# Patient Record
Sex: MALE | Race: OTHER | HISPANIC OR LATINO | URBAN - METROPOLITAN AREA
[De-identification: names, ages, dates, MRNs, and addresses within clinical notes are randomized per-mention and may not be internally consistent; named-entity substitution may affect disease eponyms.]

---

## 2017-01-25 ENCOUNTER — OUTPATIENT (OUTPATIENT)
Dept: OUTPATIENT SERVICES | Facility: HOSPITAL | Age: 22
LOS: 1 days | End: 2017-01-25
Payer: COMMERCIAL

## 2017-01-25 DIAGNOSIS — S43.015D ANTERIOR DISLOCATION OF LEFT HUMERUS, SUBSEQUENT ENCOUNTER: ICD-10-CM

## 2017-01-25 DIAGNOSIS — Z51.89 ENCOUNTER FOR OTHER SPECIFIED AFTERCARE: ICD-10-CM

## 2017-02-17 PROCEDURE — 97110 THERAPEUTIC EXERCISES: CPT

## 2017-02-17 PROCEDURE — 97140 MANUAL THERAPY 1/> REGIONS: CPT

## 2017-02-17 PROCEDURE — 97010 HOT OR COLD PACKS THERAPY: CPT

## 2017-02-17 PROCEDURE — 97162 PT EVAL MOD COMPLEX 30 MIN: CPT

## 2017-11-01 ENCOUNTER — EMERGENCY (EMERGENCY)
Facility: HOSPITAL | Age: 22
LOS: 1 days | Discharge: HOME | End: 2017-11-01
Attending: EMERGENCY MEDICINE
Payer: COMMERCIAL

## 2017-11-01 VITALS
WEIGHT: 149.91 LBS | RESPIRATION RATE: 18 BRPM | SYSTOLIC BLOOD PRESSURE: 133 MMHG | DIASTOLIC BLOOD PRESSURE: 87 MMHG | OXYGEN SATURATION: 99 % | HEART RATE: 86 BPM | HEIGHT: 68 IN | TEMPERATURE: 98 F

## 2017-11-01 VITALS — DIASTOLIC BLOOD PRESSURE: 66 MMHG | SYSTOLIC BLOOD PRESSURE: 126 MMHG | HEART RATE: 76 BPM | OXYGEN SATURATION: 99 %

## 2017-11-01 PROCEDURE — 96372 THER/PROPH/DIAG INJ SC/IM: CPT

## 2017-11-01 PROCEDURE — 99284 EMERGENCY DEPT VISIT MOD MDM: CPT | Mod: 25

## 2017-11-01 PROCEDURE — 99283 EMERGENCY DEPT VISIT LOW MDM: CPT | Mod: 25

## 2017-11-01 PROCEDURE — 73030 X-RAY EXAM OF SHOULDER: CPT | Mod: 26,LT

## 2017-11-01 PROCEDURE — 73030 X-RAY EXAM OF SHOULDER: CPT

## 2017-11-01 PROCEDURE — 99053 MED SERV 10PM-8AM 24 HR FAC: CPT

## 2017-11-01 RX ORDER — MORPHINE SULFATE 50 MG/1
8 CAPSULE, EXTENDED RELEASE ORAL ONCE
Qty: 0 | Refills: 0 | Status: DISCONTINUED | OUTPATIENT
Start: 2017-11-01 | End: 2017-11-01

## 2017-11-01 RX ADMIN — MORPHINE SULFATE 8 MILLIGRAM(S): 50 CAPSULE, EXTENDED RELEASE ORAL at 03:41

## 2017-11-01 NOTE — ED PROVIDER NOTE - SKIN, MLM
Abrasion to chin, nose, right arm, skin otherwise normal color for race, warm, dry and intact. No evidence of rash. Abrasion to chin, nose, right arm, Lt shoulder,  skin otherwise normal color for race, warm, dry and intact. No evidence of rash.

## 2017-11-01 NOTE — ED PROVIDER NOTE - PROGRESS NOTE DETAILS
Attempted reduction, reduction unsuccessful, pt will need procedural sedation. PA Note: Attempted reduction, reduction unsuccessful, pt will need procedural sedation. PA NOTE: PT seen resting comfortably in no acute distress, no acute complaints at this time, PT tolerating PO intake,  PT informed of all results, pt informed of possibility of change in radiology read after official read, PT will be DC home with OTC pain medication, follow up to ortho, sling, educated about when to return to the ED if needed. PT verbalizes that he understands all instructions and results.

## 2017-11-01 NOTE — ED PROVIDER NOTE - MUSCULOSKELETAL, MLM
Visible abnormality in left shoulder, unable to move. Spine appears normal, range of motion is not limited, no muscle or joint tenderness Visible abnormality in left shoulder, unable to move. Spine appears normal, range of motion is not limited at joints other than LT shoulder, no muscle tenderness, strength intact at hand and elbow.

## 2017-11-01 NOTE — ED ADULT NURSE REASSESSMENT NOTE - NS ED NURSE REASSESS COMMENT FT1
Ellie Henry at bedside @0501 gace 40mg propofol iv, @ 0504 10mg propofol, @ 0508 20mg propofol. procedure ended at 0513.  RN remains at bedside, pt sedated at this time, VSS will continue to monitor.

## 2017-11-01 NOTE — ED PROCEDURE NOTE - CPROC ED POST PROC CARE GUIDE1
Instructed patient/caregiver to follow-up with primary care physician./Verbal/written post procedure instructions were given to patient/caregiver./Elevate the injured extremity as instructed.

## 2017-11-01 NOTE — ED PROCEDURE NOTE - NS_ACPWITHSCRIBE_ED_ALL_ED
"I personally performed the services described in the documentation  recorded by the scribe in my presence, and it accurately and completely records my words and action.”
"I personally performed the services described in the documentation  recorded by the scribe in my presence, and it accurately and completely records my words and action.”

## 2017-11-01 NOTE — ED PROVIDER NOTE - OBJECTIVE STATEMENT
23 y/o male with PMHx left shoulder dislocation presents to the ED with c/o left shoulder pain, onset today. Pt states symptoms are similar to prior dislocation. Pt states he sustained injury after a trip and fall while running. Denies numbness, tingling, and any other acute symptoms and complaints at this time. 21 y/o male Rt hand dominate, with PMHx left shoulder dislocation UTD on vaccinations, presents to the ED with c/o left shoulder pain, onset today. Pt states symptoms are similar to prior dislocation. Pt states he sustained injury after a trip and fall while running. Denies numbness, tingling, and any other acute symptoms and complaints at this time denies LOC, head trama, N/V, dizziness.

## 2018-03-17 ENCOUNTER — EMERGENCY (EMERGENCY)
Facility: HOSPITAL | Age: 23
LOS: 1 days | Discharge: HOME | End: 2018-03-17
Attending: EMERGENCY MEDICINE | Admitting: EMERGENCY MEDICINE
Payer: COMMERCIAL

## 2018-03-17 VITALS
DIASTOLIC BLOOD PRESSURE: 80 MMHG | SYSTOLIC BLOOD PRESSURE: 146 MMHG | HEART RATE: 86 BPM | OXYGEN SATURATION: 98 % | RESPIRATION RATE: 18 BRPM | TEMPERATURE: 98 F | WEIGHT: 139.99 LBS | HEIGHT: 68 IN

## 2018-03-17 PROCEDURE — 99284 EMERGENCY DEPT VISIT MOD MDM: CPT | Mod: 25

## 2018-03-17 PROCEDURE — 99285 EMERGENCY DEPT VISIT HI MDM: CPT | Mod: 25

## 2018-03-17 PROCEDURE — 23650 CLTX SHO DSLC W/MNPJ WO ANES: CPT | Mod: LT

## 2018-03-17 PROCEDURE — 73030 X-RAY EXAM OF SHOULDER: CPT

## 2018-03-17 PROCEDURE — 73030 X-RAY EXAM OF SHOULDER: CPT | Mod: 26,LT

## 2018-03-17 PROCEDURE — 23650 CLTX SHO DSLC W/MNPJ WO ANES: CPT | Mod: 54

## 2018-03-17 NOTE — ED ADULT NURSE NOTE - CHPI ED SYMPTOMS NEG
no stiffness/no difficulty bearing weight/no abrasion/no numbness/no bruising/no fever/no back pain/no tingling/no weakness

## 2018-03-17 NOTE — ED ADULT NURSE NOTE - OBJECTIVE STATEMENT
Patient states that he was playing basketball and was pushed from behind. Patient states that he felt his left shoulder come out and tried to pop it back into place. Patient states that he is unable to get his shoulder back into place. Patient states that this has happened in the past. NAD noted.

## 2018-03-17 NOTE — ED PROVIDER NOTE - ATTENDING CONTRIBUTION TO CARE
seen with acp  here for dislocated shoulder hx of recurrent dislocations  pe deformity noted   scapular meneuver performed  dislocation reduced   agree with acps assessment hx and physical

## 2018-03-17 NOTE — ED PROVIDER NOTE - OBJECTIVE STATEMENT
23 yo M presented to ED with c/o shoulder dislocation. Pt states that he was playing basketball and reached out for ball and dislocated shoulder. Pt reports h/o dislocations in the past- 4x in 2 years. Pt did follow with Ortho and finished PT. Pt denies any paresthesias. Denies direct trauma.

## 2018-03-17 NOTE — ED ADULT TRIAGE NOTE - CHIEF COMPLAINT QUOTE
pt presents to ED with left shoulder pain. pt states "my shoulder is dislocated" s/p playing basketball. pt states his shoulder has been dislocated once before.

## 2018-11-30 ENCOUNTER — EMERGENCY (EMERGENCY)
Facility: HOSPITAL | Age: 23
LOS: 1 days | Discharge: HOME | End: 2018-11-30
Attending: STUDENT IN AN ORGANIZED HEALTH CARE EDUCATION/TRAINING PROGRAM
Payer: COMMERCIAL

## 2018-11-30 VITALS
HEART RATE: 83 BPM | RESPIRATION RATE: 18 BRPM | DIASTOLIC BLOOD PRESSURE: 96 MMHG | SYSTOLIC BLOOD PRESSURE: 141 MMHG | TEMPERATURE: 97 F | HEIGHT: 66 IN | WEIGHT: 145.06 LBS

## 2018-11-30 PROBLEM — S43.006A UNSPECIFIED DISLOCATION OF UNSPECIFIED SHOULDER JOINT, INITIAL ENCOUNTER: Chronic | Status: ACTIVE | Noted: 2018-03-17

## 2018-11-30 PROCEDURE — 73030 X-RAY EXAM OF SHOULDER: CPT

## 2018-11-30 PROCEDURE — 23650 CLTX SHO DSLC W/MNPJ WO ANES: CPT | Mod: 54

## 2018-11-30 PROCEDURE — 99283 EMERGENCY DEPT VISIT LOW MDM: CPT | Mod: 25

## 2018-11-30 PROCEDURE — 73030 X-RAY EXAM OF SHOULDER: CPT | Mod: 26,LT,76

## 2018-11-30 PROCEDURE — 23650 CLTX SHO DSLC W/MNPJ WO ANES: CPT | Mod: LT

## 2018-11-30 PROCEDURE — 99284 EMERGENCY DEPT VISIT MOD MDM: CPT | Mod: 25

## 2018-11-30 NOTE — ED PROVIDER NOTE - OBJECTIVE STATEMENT
24 yo male with acute left shoulder dislocation. patient has history of recurrent shoulder dislocations.

## 2019-05-07 ENCOUNTER — OUTPATIENT (OUTPATIENT)
Dept: OUTPATIENT SERVICES | Facility: HOSPITAL | Age: 24
LOS: 1 days | End: 2019-05-07
Payer: COMMERCIAL

## 2019-05-07 DIAGNOSIS — Z51.89 ENCOUNTER FOR OTHER SPECIFIED AFTERCARE: ICD-10-CM

## 2019-05-07 DIAGNOSIS — M25.512 PAIN IN LEFT SHOULDER: ICD-10-CM

## 2019-05-07 DIAGNOSIS — M25.312 OTHER INSTABILITY, LEFT SHOULDER: ICD-10-CM

## 2019-06-27 PROCEDURE — 97010 HOT OR COLD PACKS THERAPY: CPT

## 2019-06-27 PROCEDURE — 97163 PT EVAL HIGH COMPLEX 45 MIN: CPT

## 2019-06-27 PROCEDURE — 97140 MANUAL THERAPY 1/> REGIONS: CPT

## 2019-06-27 PROCEDURE — 97110 THERAPEUTIC EXERCISES: CPT

## 2021-10-21 NOTE — ED PROVIDER NOTE - NEUROLOGICAL, MLM
[No studies available for review at this time] : No studies available for review at this time [de-identified] : Type A tymps. 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 Slight decrease re previous audio 8/2020. Neurovascularly intact distal shoulder

## 2022-09-27 NOTE — ED ADULT TRIAGE NOTE - ESI TRIAGE ACUITY LEVEL, MLM
4 Composite Graft Text: The defect edges were debeveled with a #15 scalpel blade.  Given the location of the defect, shape of the defect, the proximity to free margins and the fact the defect was full thickness a composite graft was deemed most appropriate.  The defect was outline and then transferred to the donor site.  A full thickness graft was then excised from the donor site. The graft was then placed in the primary defect, oriented appropriately and then sutured into place.  The secondary defect was then repaired using a primary closure.

## 2024-06-20 NOTE — ED ADULT TRIAGE NOTE - PAIN RATING/NUMBER SCALE (0-10): REST
9 You can access the FollowMyHealth Patient Portal offered by [facility] by registering at the following website: [email or web address]. By joining [facility]’s FollowMyHealth portal, you will also be able to view your health information using other applications (apps) compatible with our system.